# Patient Record
Sex: MALE | Race: WHITE
[De-identification: names, ages, dates, MRNs, and addresses within clinical notes are randomized per-mention and may not be internally consistent; named-entity substitution may affect disease eponyms.]

---

## 2019-12-02 ENCOUNTER — HOSPITAL ENCOUNTER (EMERGENCY)
Dept: HOSPITAL 8 - ED | Age: 59
Discharge: HOME | End: 2019-12-02
Payer: COMMERCIAL

## 2019-12-02 VITALS — SYSTOLIC BLOOD PRESSURE: 151 MMHG | DIASTOLIC BLOOD PRESSURE: 111 MMHG

## 2019-12-02 VITALS — HEIGHT: 69 IN | BODY MASS INDEX: 25.47 KG/M2 | WEIGHT: 171.96 LBS

## 2019-12-02 DIAGNOSIS — J45.31: Primary | ICD-10-CM

## 2019-12-02 DIAGNOSIS — Z86.79: ICD-10-CM

## 2019-12-02 PROCEDURE — 94640 AIRWAY INHALATION TREATMENT: CPT

## 2019-12-02 PROCEDURE — 93005 ELECTROCARDIOGRAM TRACING: CPT

## 2019-12-02 PROCEDURE — 99283 EMERGENCY DEPT VISIT LOW MDM: CPT

## 2019-12-02 NOTE — NUR
PT TO ED FOR CHEST TIGHTNESS D/T ASTHMA. PT STATES HE WAS UNDER THE HOUSE TODAY 
AND INHALER NO LONGER HELPED. PT CONNECTE DTO ALL MONIOTRS. VSS. PT WAS SOB 
WTIH TIGHTNESS PRIOR TO RT TX. AFTER, PT STATES SOB AND TIGHTNESS HAS RESOLVED. 
PT CONNECTED TO ALL MONITORS. VSS. JUSTIN CHRISTIE AND DR. BUTT TO BS FOR 
ASSESSMENT. AWAITING ORDERS.

## 2020-12-02 ENCOUNTER — HOSPITAL ENCOUNTER (OUTPATIENT)
Dept: HOSPITAL 8 - ED | Age: 60
Setting detail: OBSERVATION
LOS: 1 days | Discharge: HOME | End: 2020-12-03
Attending: HOSPITALIST | Admitting: HOSPITALIST
Payer: COMMERCIAL

## 2020-12-02 VITALS — HEIGHT: 69 IN | BODY MASS INDEX: 24.91 KG/M2 | WEIGHT: 168.21 LBS

## 2020-12-02 DIAGNOSIS — Z20.828: ICD-10-CM

## 2020-12-02 DIAGNOSIS — D61.818: ICD-10-CM

## 2020-12-02 DIAGNOSIS — J02.9: ICD-10-CM

## 2020-12-02 DIAGNOSIS — D75.1: ICD-10-CM

## 2020-12-02 DIAGNOSIS — Z72.0: ICD-10-CM

## 2020-12-02 DIAGNOSIS — I48.0: ICD-10-CM

## 2020-12-02 DIAGNOSIS — I48.20: Primary | ICD-10-CM

## 2020-12-02 DIAGNOSIS — J45.909: ICD-10-CM

## 2020-12-02 DIAGNOSIS — D72.825: ICD-10-CM

## 2020-12-02 DIAGNOSIS — Z79.899: ICD-10-CM

## 2020-12-02 DIAGNOSIS — K20.0: ICD-10-CM

## 2020-12-02 DIAGNOSIS — I49.3: ICD-10-CM

## 2020-12-02 DIAGNOSIS — D72.829: ICD-10-CM

## 2020-12-02 LAB
<PLATELET ESTIMATE>: ADEQUATE
<PLT MORPHOLOGY>: (no result)
ALBUMIN SERPL-MCNC: 3.9 G/DL (ref 3.4–5)
ALP SERPL-CCNC: 96 U/L (ref 45–117)
ALT SERPL-CCNC: 75 U/L (ref 12–78)
ANION GAP SERPL CALC-SCNC: 5 MMOL/L (ref 5–15)
BAND#(MANUAL): 1.43 X10^3/UL
BILIRUB DIRECT SERPL-MCNC: NORMAL MG/DL
BILIRUB SERPL-MCNC: 1 MG/DL (ref 0.2–1)
CALCIUM SERPL-MCNC: 8.9 MG/DL (ref 8.5–10.1)
CHLORIDE SERPL-SCNC: 112 MMOL/L (ref 98–107)
CREAT SERPL-MCNC: 0.94 MG/DL (ref 0.7–1.3)
EOS#(MANUAL): 0.14 X10^3/UL (ref 0–0.4)
EOS% (MANUAL): 1 % (ref 1–7)
ERYTHROCYTE [DISTWIDTH] IN BLOOD BY AUTOMATED COUNT: 15.1 % (ref 9.4–14.8)
INR PPP: 1.04 (ref 0.93–1.1)
LYMPH#(MANUAL): 0.86 X10^3/UL (ref 1–3.4)
LYMPHS% (MANUAL): 6 % (ref 22–44)
MCH RBC QN AUTO: 30.6 PG (ref 27.5–34.5)
MCHC RBC AUTO-ENTMCNC: 33.8 G/DL (ref 33.2–36.2)
MD: YES
MONOS#(MANUAL): 0.29 X10^3/UL (ref 0.3–2.7)
MONOS% (MANUAL): 2 % (ref 2–9)
NEUTS BAND NFR BLD: 10 % (ref 0–7)
PLATELET # BLD AUTO: 395 X10^3/UL (ref 130–400)
PMV BLD AUTO: 7.1 FL (ref 7.4–10.4)
PROT SERPL-MCNC: 7.1 G/DL (ref 6.4–8.2)
PROTHROMBIN TIME: 11 SECONDS (ref 9.6–11.5)
RBC # BLD AUTO: 7.31 X10^6/UL (ref 4.38–5.82)
REACTIVE LYMPHS # (MANUAL): 0.29 X10^3/UL (ref 0–0)
REACTIVE LYMPHS % (MANUAL): 2 % (ref 0–0)
SEG#(MANUAL): 11.3 X10^3/UL (ref 1.8–6.8)
SEGS% (MANUAL): 79 % (ref 42–75)
TROPONIN I SERPL-MCNC: < 0.015 NG/ML (ref 0–0.04)

## 2020-12-02 PROCEDURE — 96365 THER/PROPH/DIAG IV INF INIT: CPT

## 2020-12-02 PROCEDURE — 85025 COMPLETE CBC W/AUTO DIFF WBC: CPT

## 2020-12-02 PROCEDURE — 96372 THER/PROPH/DIAG INJ SC/IM: CPT

## 2020-12-02 PROCEDURE — 84484 ASSAY OF TROPONIN QUANT: CPT

## 2020-12-02 PROCEDURE — 85610 PROTHROMBIN TIME: CPT

## 2020-12-02 PROCEDURE — 87081 CULTURE SCREEN ONLY: CPT

## 2020-12-02 PROCEDURE — 36415 COLL VENOUS BLD VENIPUNCTURE: CPT

## 2020-12-02 PROCEDURE — 80053 COMPREHEN METABOLIC PANEL: CPT

## 2020-12-02 PROCEDURE — G0378 HOSPITAL OBSERVATION PER HR: HCPCS

## 2020-12-02 PROCEDURE — 96376 TX/PRO/DX INJ SAME DRUG ADON: CPT

## 2020-12-02 PROCEDURE — 84439 ASSAY OF FREE THYROXINE: CPT

## 2020-12-02 PROCEDURE — 87880 STREP A ASSAY W/OPTIC: CPT

## 2020-12-02 PROCEDURE — 96366 THER/PROPH/DIAG IV INF ADDON: CPT

## 2020-12-02 PROCEDURE — 84481 FREE ASSAY (FT-3): CPT

## 2020-12-02 PROCEDURE — 96361 HYDRATE IV INFUSION ADD-ON: CPT

## 2020-12-02 PROCEDURE — 83036 HEMOGLOBIN GLYCOSYLATED A1C: CPT

## 2020-12-02 PROCEDURE — 99285 EMERGENCY DEPT VISIT HI MDM: CPT

## 2020-12-02 PROCEDURE — 83880 ASSAY OF NATRIURETIC PEPTIDE: CPT

## 2020-12-02 PROCEDURE — 93005 ELECTROCARDIOGRAM TRACING: CPT

## 2020-12-02 PROCEDURE — U0003 INFECTIOUS AGENT DETECTION BY NUCLEIC ACID (DNA OR RNA); SEVERE ACUTE RESPIRATORY SYNDROME CORONAVIRUS 2 (SARS-COV-2) (CORONAVIRUS DISEASE [COVID-19]), AMPLIFIED PROBE TECHNIQUE, MAKING USE OF HIGH THROUGHPUT TECHNOLOGIES AS DESCRIBED BY CMS-2020-01-R: HCPCS

## 2020-12-02 PROCEDURE — 71045 X-RAY EXAM CHEST 1 VIEW: CPT

## 2020-12-02 PROCEDURE — 83735 ASSAY OF MAGNESIUM: CPT

## 2020-12-02 PROCEDURE — 81001 URINALYSIS AUTO W/SCOPE: CPT

## 2020-12-02 PROCEDURE — 84443 ASSAY THYROID STIM HORMONE: CPT

## 2020-12-02 RX ADMIN — DILTIAZEM HYDROCHLORIDE SCH MLS/HR: 5 INJECTION INTRAVENOUS at 16:13

## 2020-12-02 RX ADMIN — SODIUM CHLORIDE, SODIUM LACTATE, POTASSIUM CHLORIDE, AND CALCIUM CHLORIDE SCH MLS/HR: .6; .31; .03; .02 INJECTION, SOLUTION INTRAVENOUS at 16:17

## 2020-12-02 RX ADMIN — DILTIAZEM HYDROCHLORIDE SCH MLS/HR: 5 INJECTION INTRAVENOUS at 14:15

## 2020-12-02 NOTE — NUR
DR DANG AT BEDSIDE, POC FOR DILT GTT WITH NO BOLUS DOSE DISCUSSED.  DILT GTT 
STARTED AT 10MG/HR AND INFUSING W/O DIFFICULTY.

## 2020-12-02 NOTE — NUR
COVID TEST IN PT ORDERS, CURRENTLY UNCOLLECTED.  SPOKE W/ PT RE: COVID SX.  PT 
DENIES SX, EXCEPT FOR SORE THROAT.  STATES "THE DOCTOR WANTS TO DO IT BECAUSE 
WE TRAVELED FROM [place] YESTERDAY." CONSULTED DR GILMORE RE: COVID TESTING 
ORDER.  PHYSICIAN DID NOT COLLECT THE SPECIMEN.

## 2020-12-02 NOTE — NUR
BREAK RN: DR GUILLORY AT BEDSIDE, PT ASSESSMENT, HEART RHYTHM DISCUSSED AND 
QUESTIONS ANSWERED.  NEW ORDERS REC'D.  PT VERBALIZES UNDERSTANDING OF PLAN FOR 
ADMIT.

## 2020-12-02 NOTE — NUR
NS BOLUS INFUSED (2L TOTAL).  CARDIZEM INFUSING AT 10ML/HR VIA PUMP THROUGH RT 
FA IV; SITE PATENT.  LR HUNG, INFUSING AT 125ML/HR VIA DIAL-AFLOW THROUGH LT FA 
IV;SITE PATENT.  CARDIAC MONITORING CONTINUING: NSR.  PT RESTING QUIETLY ON 
GURNEY, SIDE RAIL UP X1, CALL LIGHT W/IN REACH, SPOUSE IN ROOM.

## 2020-12-02 NOTE — NUR
PT REPORT FROM PRINCESS CARTER.  PT CARE TO BE RESUMED.  PT RESTING QUIETLY ON 
GURNEY.  CARDIAC MONITORING CONTINUING.  DILTIAZEM AND NS INFUSING.

## 2020-12-02 NOTE — NUR
SEEN IN ED IN [place], [place] FOR IRREGULAR HEARTBEAT.  PER SPOUSE, PT 
CONVERTED PRIOR TO BEING SEEN.  ASA 81MG TAKEN AT 1000.  REPORTS CHEST 
DISCOMFORT LT UPPER CHEST.  DENIES SOB, DYSPNEA.  HX OF ASTHMA; USED INHALER 
THIS AM

## 2020-12-03 VITALS — DIASTOLIC BLOOD PRESSURE: 89 MMHG | SYSTOLIC BLOOD PRESSURE: 155 MMHG

## 2020-12-03 VITALS — DIASTOLIC BLOOD PRESSURE: 77 MMHG | SYSTOLIC BLOOD PRESSURE: 122 MMHG

## 2020-12-03 LAB
ALBUMIN SERPL-MCNC: 3.2 G/DL (ref 3.4–5)
ALP SERPL-CCNC: 74 U/L (ref 45–117)
ALT SERPL-CCNC: 57 U/L (ref 12–78)
ANION GAP SERPL CALC-SCNC: 3 MMOL/L (ref 5–15)
BASOPHILS # BLD AUTO: 0.1 X10^3/UL (ref 0–0.1)
BASOPHILS NFR BLD AUTO: 1 % (ref 0–1)
BILIRUB SERPL-MCNC: 1 MG/DL (ref 0.2–1)
CALCIUM SERPL-MCNC: 8.5 MG/DL (ref 8.5–10.1)
CHLORIDE SERPL-SCNC: 113 MMOL/L (ref 98–107)
CREAT SERPL-MCNC: 0.96 MG/DL (ref 0.7–1.3)
EOSINOPHIL # BLD AUTO: 0.6 X10^3/UL (ref 0–0.4)
EOSINOPHIL NFR BLD AUTO: 5 % (ref 1–7)
ERYTHROCYTE [DISTWIDTH] IN BLOOD BY AUTOMATED COUNT: 15.1 % (ref 9.4–14.8)
EST. AVERAGE GLUCOSE BLD GHB EST-MCNC: 103 MG/DL (ref 0–126)
LYMPHOCYTES # BLD AUTO: 1.3 X10^3/UL (ref 1–3.4)
LYMPHOCYTES NFR BLD AUTO: 11 % (ref 22–44)
MCH RBC QN AUTO: 31 PG (ref 27.5–34.5)
MCHC RBC AUTO-ENTMCNC: 33.8 G/DL (ref 33.2–36.2)
MD: (no result)
MICROSCOPIC: (no result)
MONOCYTES # BLD AUTO: 0.5 X10^3/UL (ref 0.2–0.8)
MONOCYTES NFR BLD AUTO: 4 % (ref 2–9)
NEUTROPHILS # BLD AUTO: 9.8 X10^3/UL (ref 1.8–6.8)
NEUTROPHILS NFR BLD AUTO: 80 % (ref 42–75)
PLATELET # BLD AUTO: 356 X10^3/UL (ref 130–400)
PMV BLD AUTO: 7.1 FL (ref 7.4–10.4)
PROT SERPL-MCNC: 5.8 G/DL (ref 6.4–8.2)
RBC # BLD AUTO: 6.26 X10^6/UL (ref 4.38–5.82)
T4 FREE SERPL-MCNC: 1.02 NG/DL (ref 0.76–1.46)

## 2020-12-03 RX ADMIN — SODIUM CHLORIDE, SODIUM LACTATE, POTASSIUM CHLORIDE, AND CALCIUM CHLORIDE SCH MLS/HR: .6; .31; .03; .02 INJECTION, SOLUTION INTRAVENOUS at 05:58

## 2020-12-03 NOTE — NUR
RN ROUNDING ON PT. PT HR IN 50S. CARDIZEM STOPPED. RN CALLED PRATIK SHEA. PRATIK 
ORDERED TO STOP CARDIZEM GTT. WCTM.

## 2020-12-03 NOTE — NUR
REPORT RECIEVED FROM PRINCESS CONNELL. PT RESTING IN BED, ALL MONITORS IN PLACE, PT 
STATES NO NEEDS AT THIS TIME

## 2020-12-08 ENCOUNTER — HOSPITAL ENCOUNTER (EMERGENCY)
Dept: HOSPITAL 8 - ED | Age: 60
Discharge: HOME | End: 2020-12-08
Payer: COMMERCIAL

## 2020-12-08 VITALS — BODY MASS INDEX: 24.49 KG/M2 | HEIGHT: 69 IN | WEIGHT: 165.35 LBS

## 2020-12-08 VITALS — DIASTOLIC BLOOD PRESSURE: 72 MMHG | SYSTOLIC BLOOD PRESSURE: 121 MMHG

## 2020-12-08 DIAGNOSIS — I49.9: ICD-10-CM

## 2020-12-08 DIAGNOSIS — I48.91: ICD-10-CM

## 2020-12-08 DIAGNOSIS — R07.89: ICD-10-CM

## 2020-12-08 DIAGNOSIS — I48.0: Primary | ICD-10-CM

## 2020-12-08 DIAGNOSIS — D75.1: ICD-10-CM

## 2020-12-08 DIAGNOSIS — J45.909: ICD-10-CM

## 2020-12-08 LAB
ALBUMIN SERPL-MCNC: 4.2 G/DL (ref 3.4–5)
ALP SERPL-CCNC: 105 U/L (ref 45–117)
ALT SERPL-CCNC: 83 U/L (ref 12–78)
ANION GAP SERPL CALC-SCNC: 7 MMOL/L (ref 5–15)
BASOPHILS # BLD AUTO: 0 X10^3/UL (ref 0–0.1)
BASOPHILS NFR BLD AUTO: 0 % (ref 0–1)
BILIRUB SERPL-MCNC: 1.2 MG/DL (ref 0.2–1)
CALCIUM SERPL-MCNC: 9.1 MG/DL (ref 8.5–10.1)
CHLORIDE SERPL-SCNC: 107 MMOL/L (ref 98–107)
CREAT SERPL-MCNC: 1.17 MG/DL (ref 0.7–1.3)
EOSINOPHIL # BLD AUTO: 0.4 X10^3/UL (ref 0–0.4)
EOSINOPHIL NFR BLD AUTO: 2 % (ref 1–7)
ERYTHROCYTE [DISTWIDTH] IN BLOOD BY AUTOMATED COUNT: 15.4 % (ref 9.4–14.8)
LYMPHOCYTES # BLD AUTO: 0.9 X10^3/UL (ref 1–3.4)
LYMPHOCYTES NFR BLD AUTO: 5 % (ref 22–44)
MCH RBC QN AUTO: 30.6 PG (ref 27.5–34.5)
MCHC RBC AUTO-ENTMCNC: 33.9 G/DL (ref 33.2–36.2)
MD: (no result)
MONOCYTES # BLD AUTO: 0.6 X10^3/UL (ref 0.2–0.8)
MONOCYTES NFR BLD AUTO: 4 % (ref 2–9)
NEUTROPHILS # BLD AUTO: 14.9 X10^3/UL (ref 1.8–6.8)
NEUTROPHILS NFR BLD AUTO: 89 % (ref 42–75)
PLATELET # BLD AUTO: 434 X10^3/UL (ref 130–400)
PMV BLD AUTO: 7 FL (ref 7.4–10.4)
PROT SERPL-MCNC: 7.5 G/DL (ref 6.4–8.2)
RBC # BLD AUTO: 7.37 X10^6/UL (ref 4.38–5.82)
TROPONIN I SERPL-MCNC: < 0.015 NG/ML (ref 0–0.04)

## 2020-12-08 PROCEDURE — 80053 COMPREHEN METABOLIC PANEL: CPT

## 2020-12-08 PROCEDURE — 82668 ASSAY OF ERYTHROPOIETIN: CPT

## 2020-12-08 PROCEDURE — 36415 COLL VENOUS BLD VENIPUNCTURE: CPT

## 2020-12-08 PROCEDURE — 84484 ASSAY OF TROPONIN QUANT: CPT

## 2020-12-08 PROCEDURE — 99285 EMERGENCY DEPT VISIT HI MDM: CPT

## 2020-12-08 PROCEDURE — 85025 COMPLETE CBC W/AUTO DIFF WBC: CPT

## 2020-12-08 PROCEDURE — 71045 X-RAY EXAM CHEST 1 VIEW: CPT

## 2020-12-08 PROCEDURE — 99195 PHLEBOTOMY: CPT

## 2020-12-08 PROCEDURE — 93005 ELECTROCARDIOGRAM TRACING: CPT

## 2020-12-08 PROCEDURE — 96374 THER/PROPH/DIAG INJ IV PUSH: CPT

## 2020-12-08 NOTE — NUR
Pt arrives with HR in 150's. Pt states recent hospitilization for same. 
Palpitations began this morning. Denies CP, SOB, nausea.

PIV started, given Diltiazem push, HR now 100-120's.

## 2020-12-15 ENCOUNTER — HOSPITAL ENCOUNTER (OUTPATIENT)
Dept: HOSPITAL 8 - CFH | Age: 60
Discharge: HOME | End: 2020-12-15
Attending: INTERNAL MEDICINE
Payer: COMMERCIAL

## 2020-12-15 DIAGNOSIS — I34.0: Primary | ICD-10-CM

## 2020-12-15 DIAGNOSIS — I10: ICD-10-CM

## 2020-12-15 DIAGNOSIS — I48.91: ICD-10-CM

## 2020-12-15 PROCEDURE — 93306 TTE W/DOPPLER COMPLETE: CPT

## 2020-12-18 ENCOUNTER — HOSPITAL ENCOUNTER (OUTPATIENT)
Dept: HOSPITAL 8 - CFH | Age: 60
Discharge: HOME | End: 2020-12-18
Attending: INTERNAL MEDICINE
Payer: COMMERCIAL

## 2020-12-18 DIAGNOSIS — I48.91: ICD-10-CM

## 2020-12-18 DIAGNOSIS — I25.89: Primary | ICD-10-CM

## 2020-12-18 PROCEDURE — A9502 TC99M TETROFOSMIN: HCPCS

## 2020-12-18 PROCEDURE — 93017 CV STRESS TEST TRACING ONLY: CPT

## 2020-12-18 PROCEDURE — 78452 HT MUSCLE IMAGE SPECT MULT: CPT

## 2021-01-08 ENCOUNTER — HOSPITAL ENCOUNTER (OUTPATIENT)
Dept: HOSPITAL 8 - CFH | Age: 61
Discharge: HOME | End: 2021-01-08
Attending: PHYSICIAN ASSISTANT
Payer: COMMERCIAL

## 2021-01-08 DIAGNOSIS — I25.10: Primary | ICD-10-CM

## 2021-01-08 DIAGNOSIS — I48.91: ICD-10-CM

## 2021-01-08 DIAGNOSIS — R00.2: ICD-10-CM

## 2021-01-08 PROCEDURE — 75574 CT ANGIO HRT W/3D IMAGE: CPT
